# Patient Record
Sex: FEMALE
[De-identification: names, ages, dates, MRNs, and addresses within clinical notes are randomized per-mention and may not be internally consistent; named-entity substitution may affect disease eponyms.]

---

## 2020-04-19 ENCOUNTER — HOSPITAL ENCOUNTER (EMERGENCY)
Dept: HOSPITAL 88 - ER | Age: 45
Discharge: HOME | End: 2020-04-19
Payer: COMMERCIAL

## 2020-04-19 VITALS — SYSTOLIC BLOOD PRESSURE: 131 MMHG | DIASTOLIC BLOOD PRESSURE: 88 MMHG

## 2020-04-19 VITALS — HEIGHT: 63 IN | BODY MASS INDEX: 27.11 KG/M2 | WEIGHT: 153 LBS

## 2020-04-19 DIAGNOSIS — D64.9: ICD-10-CM

## 2020-04-19 DIAGNOSIS — R04.0: Primary | ICD-10-CM

## 2020-04-19 DIAGNOSIS — I10: ICD-10-CM

## 2020-04-19 PROCEDURE — 99283 EMERGENCY DEPT VISIT LOW MDM: CPT

## 2020-04-19 NOTE — XMS REPORT
Patient Summary Document

                             Created on: 2020



JIMENA COBURN

External Reference #: [id number]

: 1975

Sex: Female



Demographics







                          Address                   [address] [place] [address]  [address]

 

                          Home Phone                [phone]

 

                          Preferred Language        Unknown

 

                          Marital Status            Unknown

 

                          Adventist Affiliation     Unknown

 

                          Race                      Unknown

 

                          Ethnic Group              Unknown





Author







                          Author                    [facility]

 

                          Address                   Unknown

 

                          Phone                     Unavailable







Support







                Name            Relationship    Address         Phone

 

                    KYLE COBURN       PRS                 [address] [place]  [address] [phone]

 

                    KYLE  IRISH       PRS                 [address] [place]  [address] [phone]







Care Team Providers







                    Care Team Member Name    Role                Phone

 

                          Unavailable               Unavailable







Payers







             Payer Name    Policy Type    Policy Number    Effective Date    Expiration Date







Problems

This patient has no known problems.



Allergies, Adverse Reactions, Alerts







          Allergy Name    Allergy Type    Status    Severity    Reaction(s)    Onset Date    Inactive 

Date                      Treating Clinician        Comments

 

        No Known Allergies    DA      Active    U               2016-10-31 00:00:00                     







Medications

This patient has no known medications.

## 2020-04-21 ENCOUNTER — HOSPITAL ENCOUNTER (OUTPATIENT)
Dept: HOSPITAL 88 - ER | Age: 45
Setting detail: OBSERVATION
LOS: 2 days | Discharge: HOME | End: 2020-04-23
Attending: INTERNAL MEDICINE | Admitting: INTERNAL MEDICINE
Payer: COMMERCIAL

## 2020-04-21 VITALS — DIASTOLIC BLOOD PRESSURE: 88 MMHG | SYSTOLIC BLOOD PRESSURE: 143 MMHG

## 2020-04-21 VITALS — DIASTOLIC BLOOD PRESSURE: 90 MMHG | SYSTOLIC BLOOD PRESSURE: 136 MMHG

## 2020-04-21 VITALS — SYSTOLIC BLOOD PRESSURE: 136 MMHG | DIASTOLIC BLOOD PRESSURE: 90 MMHG

## 2020-04-21 VITALS — WEIGHT: 182 LBS | BODY MASS INDEX: 32.25 KG/M2 | HEIGHT: 63 IN

## 2020-04-21 VITALS — DIASTOLIC BLOOD PRESSURE: 84 MMHG | SYSTOLIC BLOOD PRESSURE: 146 MMHG

## 2020-04-21 VITALS — SYSTOLIC BLOOD PRESSURE: 154 MMHG | DIASTOLIC BLOOD PRESSURE: 88 MMHG

## 2020-04-21 VITALS — DIASTOLIC BLOOD PRESSURE: 85 MMHG | SYSTOLIC BLOOD PRESSURE: 143 MMHG

## 2020-04-21 VITALS — SYSTOLIC BLOOD PRESSURE: 143 MMHG | DIASTOLIC BLOOD PRESSURE: 88 MMHG

## 2020-04-21 DIAGNOSIS — I10: ICD-10-CM

## 2020-04-21 DIAGNOSIS — E66.01: ICD-10-CM

## 2020-04-21 DIAGNOSIS — R04.0: Primary | ICD-10-CM

## 2020-04-21 DIAGNOSIS — K59.00: ICD-10-CM

## 2020-04-21 DIAGNOSIS — J34.2: ICD-10-CM

## 2020-04-21 DIAGNOSIS — J01.90: ICD-10-CM

## 2020-04-21 LAB
ANION GAP SERPL CALC-SCNC: 9.5 MMOL/L (ref 8–16)
BASOPHILS # BLD AUTO: 0.1 10*3/UL (ref 0–0.1)
BASOPHILS NFR BLD AUTO: 0.6 % (ref 0–1)
BUN SERPL-MCNC: 9 MG/DL (ref 7–26)
BUN/CREAT SERPL: 12 (ref 6–25)
CALCIUM SERPL-MCNC: 8.8 MG/DL (ref 8.4–10.2)
CHLORIDE SERPL-SCNC: 105 MMOL/L (ref 98–107)
CO2 SERPL-SCNC: 26 MMOL/L (ref 22–29)
DEPRECATED INR PLAS: 0.9
DEPRECATED NEUTROPHILS # BLD AUTO: 6.4 10*3/UL (ref 2.1–6.9)
EGFRCR SERPLBLD CKD-EPI 2021: > 60 ML/MIN (ref 60–?)
EOSINOPHIL # BLD AUTO: 0.3 10*3/UL (ref 0–0.4)
EOSINOPHIL NFR BLD AUTO: 2.7 % (ref 0–6)
ERYTHROCYTE [DISTWIDTH] IN CORD BLOOD: 16.3 % (ref 11.7–14.4)
GLUCOSE SERPLBLD-MCNC: 147 MG/DL (ref 74–118)
HCT VFR BLD AUTO: 31.3 % (ref 34.2–44.1)
HGB BLD-MCNC: 10.1 G/DL (ref 12–16)
LYMPHOCYTES # BLD: 2.1 10*3/UL (ref 1–3.2)
LYMPHOCYTES NFR BLD AUTO: 21.8 % (ref 18–39.1)
MCH RBC QN AUTO: 25.6 PG (ref 28–32)
MCHC RBC AUTO-ENTMCNC: 32.3 G/DL (ref 31–35)
MCV RBC AUTO: 79.4 FL (ref 81–99)
MONOCYTES # BLD AUTO: 0.8 10*3/UL (ref 0.2–0.8)
MONOCYTES NFR BLD AUTO: 8.1 % (ref 4.4–11.3)
NEUTS SEG NFR BLD AUTO: 66.4 % (ref 38.7–80)
PLATELET # BLD AUTO: 309 X10E3/UL (ref 140–360)
POTASSIUM SERPL-SCNC: 3.5 MMOL/L (ref 3.5–5.1)
PROTHROMBIN TIME: 12.7 SECONDS (ref 11.9–14.5)
RBC # BLD AUTO: 3.94 X10E6/UL (ref 3.6–5.1)
SODIUM SERPL-SCNC: 137 MMOL/L (ref 136–145)

## 2020-04-21 PROCEDURE — 36415 COLL VENOUS BLD VENIPUNCTURE: CPT

## 2020-04-21 PROCEDURE — 86900 BLOOD TYPING SEROLOGIC ABO: CPT

## 2020-04-21 PROCEDURE — 86850 RBC ANTIBODY SCREEN: CPT

## 2020-04-21 PROCEDURE — 85610 PROTHROMBIN TIME: CPT

## 2020-04-21 PROCEDURE — 85025 COMPLETE CBC W/AUTO DIFF WBC: CPT

## 2020-04-21 PROCEDURE — 70487 CT MAXILLOFACIAL W/DYE: CPT

## 2020-04-21 PROCEDURE — 99284 EMERGENCY DEPT VISIT MOD MDM: CPT

## 2020-04-21 PROCEDURE — 80053 COMPREHEN METABOLIC PANEL: CPT

## 2020-04-21 PROCEDURE — 80048 BASIC METABOLIC PNL TOTAL CA: CPT

## 2020-04-21 RX ADMIN — Medication PRN MG: at 10:55

## 2020-04-21 RX ADMIN — TAZOBACTAM SODIUM AND PIPERACILLIN SODIUM SCH MLS/HR: 375; 3 INJECTION, SOLUTION INTRAVENOUS at 20:10

## 2020-04-21 RX ADMIN — TAZOBACTAM SODIUM AND PIPERACILLIN SODIUM SCH MLS/HR: 375; 3 INJECTION, SOLUTION INTRAVENOUS at 12:22

## 2020-04-21 RX ADMIN — Medication PRN MG: at 06:55

## 2020-04-21 RX ADMIN — SODIUM CHLORIDE PRN MG: 900 INJECTION INTRAVENOUS at 10:51

## 2020-04-21 RX ADMIN — Medication PRN MG: at 20:08

## 2020-04-21 RX ADMIN — SODIUM CHLORIDE SCH MLS/HR: 9 INJECTION, SOLUTION INTRAVENOUS at 03:35

## 2020-04-21 RX ADMIN — AMLODIPINE BESYLATE SCH MG: 5 TABLET ORAL at 13:24

## 2020-04-21 RX ADMIN — TAZOBACTAM SODIUM AND PIPERACILLIN SODIUM SCH MLS/HR: 375; 3 INJECTION, SOLUTION INTRAVENOUS at 06:28

## 2020-04-21 RX ADMIN — Medication PRN MG: at 03:30

## 2020-04-21 RX ADMIN — HYDROCODONE BITARTRATE AND ACETAMINOPHEN PRN EA: 5; 325 TABLET ORAL at 15:55

## 2020-04-21 RX ADMIN — HYDROCODONE BITARTRATE AND ACETAMINOPHEN PRN EA: 5; 325 TABLET ORAL at 22:11

## 2020-04-21 RX ADMIN — SODIUM CHLORIDE SCH MLS/HR: 9 INJECTION, SOLUTION INTRAVENOUS at 10:51

## 2020-04-21 RX ADMIN — SODIUM CHLORIDE PRN MG: 900 INJECTION INTRAVENOUS at 20:08

## 2020-04-21 RX ADMIN — SODIUM CHLORIDE PRN MG: 900 INJECTION INTRAVENOUS at 03:25

## 2020-04-21 RX ADMIN — SODIUM CHLORIDE PRN MG: 900 INJECTION INTRAVENOUS at 06:55

## 2020-04-21 NOTE — DIAGNOSTIC IMAGING REPORT
History:Nose bleed.



Comparison studies: None



Technique:

Axial images were obtained through the facial region.

Coronal and sagittal images reconstructed from the axial data.

Dose modulation, iterative reconstruction, and/or weight based adjustment of

the mA/kV was utilized to reduce the radiation dose to as low as reasonably

achievable.

Intravenous contrast: 100 cc of Isovue 300.



Findings:



Soft tissues: 

Status post packing material in bilateral nasal cavity. Partial opacification

of bilateral nasal cavity that soft tissue density mixed with air related to

recent intervention. Soft tissue air also noted within the 1 and nasopharynx

also likely related to recent intervention. No discrete enhancing mass or

peripheral rim-enhancing fluid collection.



Bones: 

No acute fracture. Deviation of nasal septum to the left.



Orbits: 

Globes: Intact.

Extra or intraconal abnormalities: None.



Paranasal sinuses:

 Mild mucosal thickening in bilateral sphenoid sinuses. Minimal mucosal

thickening in the inferior aspect of left frontal sinus.

Mild mucosal thickening in bilateral ethmoid sinuses.

Moderate bilateral (right more than left) mucosal thickeningV air, hyperdense

material with fluid level in bilateral maxillary sinuses.



IMPRESSION:



1. Soft tissue density admixed with air in bilateral nasal cavity and

nasopharynx possibly related to recent intervention status post placement of

nasal packing material.



2. Moderate bilateral maxillary sinus mucosal inflammatory changes with

associated intrinsic hyperdensity and fluid level may represent inspissated

mucus secretions, allergic fungal sinusitis or fluid admixed with hemorrhage in

appropriate clinical setting.



3. Mild to moderate mucosal inflammatory changes in bilateral sphenoid and

ethmoid sinuses.



4. No acute fracture. No abnormal enhancing mass or soft tissue abscess.



Signed by: Dr. Mary Juan M.D. on 4/21/2020 2:49 AM

## 2020-04-21 NOTE — HISTORY AND PHYSICAL
CHIEF COMPLAINT:  Epistaxis.

 

HISTORY OF PRESENT ILLNESS:  This is a 45-year-old  female, who apparently has

been having epistaxis ongoing for the last several days.  The patient reports she went

to [facility] on 2 occasions with very minimal relief and at that time, she has had

significant amount of epistaxis and was discharged.  She then presented to [facility] on 2 occasions.  Of note, at the first occasion, she was given a Rhino

Rocket and was sent to ENT in which she saw Dr. Chapman on Monday of this week,

04/20/2020, in which she cauterized the area of the bleeding and was discharged to home.

 Last night, she experienced significant amount of epistaxis and came into the ED for

further evaluation and management.  The patient was admitted for further evaluation.

The patient has Rhino Rockets in both nasal canals.  ENT came and evaluated the patient.

 CT imaging concerning for underlying sinusitis in which ID was consulted. 

 

REVIEW OF SYSTEMS:

Pertinent positive:  Epistaxis. 

 

The rest of the 14-point review of systems have been reviewed with the patient and are

negative. 

 

ALLERGIES:  ENALAPRIL AND KIWI.

 

MEDICATIONS:  Home medications are amlodipine.

 

PAST MEDICAL HISTORY:  Hypertension.

 

PAST SURGICAL HISTORY:  Reports none.

 

FAMILY HISTORY:  Hypertension and diabetes.

 

SOCIAL HISTORY:  No drugs.  No alcohol.  Does not smoke.  Good social support.

 

PHYSICAL EXAMINATION:

VITAL SIGNS:  Temperature is 96.2, pulse 83, respiratory rate is 18, blood pressure

146/84, and pulse ox 100% on room air. 

GENERAL:  No acute distress.  Alert and oriented x3.  Cooperative on examination. 

HEENT:  Head; normocephalic and atraumatic.  Eyes; pupils are reactive to light

bilaterally.  Extraocular movements are intact bilaterally.  Neck was supple.  Good

range of motion.  Throat, no evidence of erythema or exudates in the posterior pharynx.

Has poor dentition. 

PULMONARY:  Clear to auscultation bilaterally.  No wheezing, rales, or rhonchi.  No

crackles appreciated. 

CARDIOVASCULAR:  Positive S1 and S2.  No murmurs, rubs, or gallops appreciated. 

ABDOMEN:  Soft, nondistended, and nontender to palpation.  Bowel sounds present. 

MUSCULOSKELETAL:  Strength is 5/5 throughout.  No evidence of any muscle deficits on

examination.  No weakness appreciated. 

NEUROLOGIC:  Cranial nerves 2 thorough 12 grossly intact.  No evidence of any

neurological deficits on examination. 

SKIN:  Intact.  Warm to touch.  Good capillary refill. 

PSYCHIATRIC:  Normal affect and mood. 

EXTREMITIES:  No edema.  Good range of motion throughout.

LABORATORY DATA:  Labs show white count 9.5, hemoglobin 10, hematocrit 31, platelets of

309.  Coagulation; PT 12, INR 0.9.  Chemistry; sodium 137, potassium 3.5, chloride 105,

bicarb 26, anion gap 7.5, BUN 9, creatinine 0.75, glucose 147, calcium is 8.8. 

 

IMAGING STUDIES:  CT of the face shows soft tissue density mixed with air in the

bilateral nasal cannula and nasopharynx secondary to underlying and recent intervention.

 Moderate bilateral maxillary sinus mucosal inflammatory changes with associated

intrinsic hyperdensity and fluids, may represent some mucus secretions, possible

underlying infection.  Mild-to-moderate mucosal inflammation, changes in the bilateral

sphenoid and ethmoid sinuses.  No acute fracture.  No abnormal enhancing mass or soft

tissue abscess. 

 

IMPRESSION:  

1. Nasal epistaxis.

2. Hypertension.

3. Morbidly obese.

4. Sinusitis.

 

PLAN:  At this time, she has bilateral nasal rocket placed by ER.  ENT was consulted and

he came and evaluated the patient.  We will like to monitor the patient over the next

several days.  Her hemoglobin is stable.  We will get morning labs and we will monitor

these very closely.  ID was consulted for underlying sinusitis.  She is on IV

antibiotics.  We will continue with antibiotics for now.  Hold all anticoagulation and

anti-platelet therapy for now.  Encourage ambulation.  Continue with a regular heart

healthy diet.  Monitor very closely.  Follow up with the rest of the consultants. 

 

 

 

 

______________________________

MD FALGUNI Freitas/ZENON

D:  04/21/2020 13:45:17

T:  04/21/2020 20:41:45

Job #:  [id number]

## 2020-04-21 NOTE — NUR
Received bedside report from day nurse. Patient awake and resting in bed, no s/s of distress 
at this time. Bed locked and in low position, alarm on, call light placed within reach. 
Instructed to call for assistance if needed, verbalized understanding. All safety measures 
in place. Will continue to monitor.

## 2020-04-21 NOTE — NUR
PT RECEIVED FROM ER. ANALISA4. EDUCATED PT ABOUT FALL PRECAUTIONS. PT VERBALIZED UNDERSTANDING. 
 CALL LIGHT WITH IN EASY REACH. BED IS LOW AND LOCKED.  SIDE RAILS X2. PT DENIES NEEDS AT 
THIS TIME.

## 2020-04-21 NOTE — NUR
Pt went to restroom ambulatory with steady gait. Nasal bleeding has been 
controlled per pt report. Pain at 5/10. Dr Sullivan minimizes the air pressure of 
the rhino rocket for pt comfort.

## 2020-04-21 NOTE — NUR
Assumed care from offgoing RN, pt lying on R side, no active bleeding noted. Pt 
given warm blanket. Pt report pain medication helping.

## 2020-04-21 NOTE — CONSULTATION
DATE OF CONSULTATION:    

 

REASON FOR CONSULTATION:  Sinusitis.

 

HISTORY OF PRESENT ILLNESS:  Thank you for asking me to see this patient.  This patient

who is a 45-year-old  female, who comes in with nosebleeds.  She was

originally in [place].  She was given some local treatment, discharged home, coming

back with nosebleed.  The patient is currently seen by ENT.  Her nose was packed. 

 

PAST MEDICAL HISTORY:  Hypertension.

 

SURGICAL HISTORY:  She denies.

 

ALLERGIES:  NKA.

 

SOCIAL HISTORY:  There is no smoking, drug abuse, or alcohol abuse.

 

FAMILY HISTORY:  Otherwise noncontributory.

 

REVIEW OF SYSTEMS:

HEENT:  Negative. 

PULMONARY:  Negative. 

CARDIAC:  Negative. 

:  Negative. 

SKIN:  Otherwise negative.

 

PHYSICAL EXAMINATION:

GENERAL:  She is currently alert, oriented, does not seem to be in acute distress. 

VITAL SIGNS:  Stable.  Currently afebrile. 

HEENT:  She is nonicteric. 

NECK:  Supple. 

CHEST:  Clear. 

HEART:  S1, S2.  No S3, S4 or no murmurs. 

ABDOMEN:  Soft.

IMPRESSION:  Nosebleed.  Agree with Zosyn, can change to oral Augmentin.  She will be

discharged home with oral Augmentin after she is seen by ENT.  We will follow with you. 

 

Thank you for asking me to see this patient.

 

 

 

 

______________________________

MD OSEI Benoit/ZENON

D:  04/21/2020 15:51:04

T:  04/21/2020 21:20:58

Job #:  [id number]

## 2020-04-22 VITALS — SYSTOLIC BLOOD PRESSURE: 160 MMHG | DIASTOLIC BLOOD PRESSURE: 107 MMHG

## 2020-04-22 VITALS — SYSTOLIC BLOOD PRESSURE: 146 MMHG | DIASTOLIC BLOOD PRESSURE: 89 MMHG

## 2020-04-22 VITALS — DIASTOLIC BLOOD PRESSURE: 77 MMHG | SYSTOLIC BLOOD PRESSURE: 133 MMHG

## 2020-04-22 VITALS — SYSTOLIC BLOOD PRESSURE: 147 MMHG | DIASTOLIC BLOOD PRESSURE: 96 MMHG

## 2020-04-22 VITALS — SYSTOLIC BLOOD PRESSURE: 139 MMHG | DIASTOLIC BLOOD PRESSURE: 97 MMHG

## 2020-04-22 LAB
ALBUMIN SERPL-MCNC: 3.6 G/DL (ref 3.5–5)
ALBUMIN/GLOB SERPL: 0.9 {RATIO} (ref 0.8–2)
ALP SERPL-CCNC: 80 IU/L (ref 40–150)
ALT SERPL-CCNC: 37 IU/L (ref 0–55)
ANION GAP SERPL CALC-SCNC: 12.3 MMOL/L (ref 8–16)
BASOPHILS # BLD AUTO: 0.1 10*3/UL (ref 0–0.1)
BASOPHILS NFR BLD AUTO: 0.4 % (ref 0–1)
BUN SERPL-MCNC: 5 MG/DL (ref 7–26)
BUN/CREAT SERPL: 6 (ref 6–25)
CALCIUM SERPL-MCNC: 9.1 MG/DL (ref 8.4–10.2)
CHLORIDE SERPL-SCNC: 101 MMOL/L (ref 98–107)
CO2 SERPL-SCNC: 27 MMOL/L (ref 22–29)
DEPRECATED INR PLAS: 0.96
DEPRECATED NEUTROPHILS # BLD AUTO: 10.4 10*3/UL (ref 2.1–6.9)
EGFRCR SERPLBLD CKD-EPI 2021: > 60 ML/MIN (ref 60–?)
EOSINOPHIL # BLD AUTO: 0.1 10*3/UL (ref 0–0.4)
EOSINOPHIL NFR BLD AUTO: 0.5 % (ref 0–6)
ERYTHROCYTE [DISTWIDTH] IN CORD BLOOD: 16.4 % (ref 11.7–14.4)
GLOBULIN PLAS-MCNC: 4.1 G/DL (ref 2.3–3.5)
GLUCOSE SERPLBLD-MCNC: 124 MG/DL (ref 74–118)
HCT VFR BLD AUTO: 29.9 % (ref 34.2–44.1)
HGB BLD-MCNC: 9.4 G/DL (ref 12–16)
LYMPHOCYTES # BLD: 2 10*3/UL (ref 1–3.2)
LYMPHOCYTES NFR BLD AUTO: 14.8 % (ref 18–39.1)
MCH RBC QN AUTO: 25.3 PG (ref 28–32)
MCHC RBC AUTO-ENTMCNC: 31.4 G/DL (ref 31–35)
MCV RBC AUTO: 80.6 FL (ref 81–99)
MONOCYTES # BLD AUTO: 1 10*3/UL (ref 0.2–0.8)
MONOCYTES NFR BLD AUTO: 7.4 % (ref 4.4–11.3)
NEUTS SEG NFR BLD AUTO: 76.5 % (ref 38.7–80)
PLATELET # BLD AUTO: 283 X10E3/UL (ref 140–360)
POTASSIUM SERPL-SCNC: 4.3 MMOL/L (ref 3.5–5.1)
PROTHROMBIN TIME: 13.4 SECONDS (ref 11.9–14.5)
RBC # BLD AUTO: 3.71 X10E6/UL (ref 3.6–5.1)
SODIUM SERPL-SCNC: 136 MMOL/L (ref 136–145)

## 2020-04-22 RX ADMIN — Medication PRN MG: at 09:09

## 2020-04-22 RX ADMIN — TAZOBACTAM SODIUM AND PIPERACILLIN SODIUM SCH MLS/HR: 375; 3 INJECTION, SOLUTION INTRAVENOUS at 11:24

## 2020-04-22 RX ADMIN — DOCUSATE SODIUM SCH MG: 100 TABLET, FILM COATED ORAL at 09:10

## 2020-04-22 RX ADMIN — SODIUM CHLORIDE PRN MG: 900 INJECTION INTRAVENOUS at 09:15

## 2020-04-22 RX ADMIN — Medication PRN MG: at 05:11

## 2020-04-22 RX ADMIN — SODIUM CHLORIDE PRN MG: 900 INJECTION INTRAVENOUS at 20:43

## 2020-04-22 RX ADMIN — Medication PRN MG: at 01:20

## 2020-04-22 RX ADMIN — HYDROCODONE BITARTRATE AND ACETAMINOPHEN PRN EA: 10; 325 TABLET ORAL at 16:52

## 2020-04-22 RX ADMIN — TAZOBACTAM SODIUM AND PIPERACILLIN SODIUM SCH MLS/HR: 375; 3 INJECTION, SOLUTION INTRAVENOUS at 03:57

## 2020-04-22 RX ADMIN — DOCUSATE SODIUM SCH MG: 100 TABLET, FILM COATED ORAL at 16:48

## 2020-04-22 RX ADMIN — AMLODIPINE BESYLATE SCH MG: 5 TABLET ORAL at 07:58

## 2020-04-22 RX ADMIN — SODIUM CHLORIDE PRN MG: 900 INJECTION INTRAVENOUS at 01:20

## 2020-04-22 RX ADMIN — TAZOBACTAM SODIUM AND PIPERACILLIN SODIUM SCH MLS/HR: 375; 3 INJECTION, SOLUTION INTRAVENOUS at 20:35

## 2020-04-22 RX ADMIN — Medication PRN MG: at 13:11

## 2020-04-22 RX ADMIN — Medication PRN MG: at 20:43

## 2020-04-22 NOTE — NUR
Bedside report given to day nurse. Patient awake and sitting up in bed, no s/s of distress 
at this time. All safety measures in place.

## 2020-04-22 NOTE — PROGRESS NOTE
DATE:    

 

SUBJECTIVE:  Ms. Singh is a pleasant 45-year-old lady, who was admitted with a chief

complaint of nosebleed.  The patient was seen by ENT, has now bilateral nostrils are

packed and comfortable in bed with poor appetite. 

 

REVIEW OF SYSTEMS:

Complain of constipation and needs some help.  Also complained of poor appetite

secondary to unable to taste the food.  However, no nausea, no vomiting, no fever, no

chills.  No chest pain.  No shortness of breath.  No rash.  No headache. 

 

PHYSICAL EXAMINATION:

VITAL SIGNS:  Temperature 98.2, pulse 98, respirations 20, and blood pressure 160/107. 

GENERAL:  Alert and oriented, in no acute distress. 

CV:  S1 and S2. 

CHEST:  Equal expansion.  Clear to auscultation.  No acute distress. 

ABDOMEN:  Soft and nontender.  No distention. 

HEENT:  Moist.  No pallor.  No JVD.  Bilateral nostrils are packed.  ENT noted the same

epistaxis with which continues to have slight oozing.  The patient was seen by ENT

earlier this morning. 

EXTREMITIES:  No acute finding.

MEDICATIONS:  Medication list reviewed.  As far as Infectious Disease point of view, the

patient is on Zosyn. 

 

LABORATORY STUDIES:  White count of 13.58, hemoglobin 9.4, platelet 283.  Sodium 136,

potassium 4.3, creatinine 0.77. 

 

RADIOLOGY STUDIES:  Had a CT of the face yesterday showing the soft-tissue density

admixed with air in bilateral nasal cavity with nasopharynx, possibly related to the

recent intubation, status post placement of nasal packing material.  Also, showed

moderate bilateral maxillary sinus mucosal inflammatory changes with associated

intrinsic hyperdense area of fluid level may represent inspissated mucous secretion.

Mild-to-moderate mucosal inflammatory changes also was noted in bilateral sphenoids and

ethmoid sinuses.  No acute fracture or no abnormal enhancing mass or soft tissue abscess

noted. 

 

ASSESSMENT AND PLAN:  Nasal bleed/epistaxis, seen by ENT, most likely stays one more day

prior to discharge.  Currently on Zosyn with the plan to discharge with Augmentin when

cleared by ENT.  The patient is constipated.  We will give the patient 30 mL of

lactulose and then 100 mg of Colace b.i.d.  Discussed with the nurse about plan of care.

 This case was discussed with Dr. Oden in details.  Further management of this patient

is based on daily findings on laboratory and physical examination. 

 

 

Dictated by Shaggy Arevalo) GITA Saldana

 

______________________________

MD CRIS Benoit/ZENON

D:  04/22/2020 08:46:57

T:  04/22/2020 10:00:24

Job #:  [id number]

## 2020-04-22 NOTE — PROGRESS NOTE
DATE:  04/22/2020

 

Medicine Progress Note 

 

SUBJECTIVE:  The patient is doing well today with no complaints.  She still has

bilateral rhino rockets.  No evidence of any bleeding.  Her hemoglobin did drop today. 

 

PHYSICAL EXAMINATION:

VITAL SIGNS:  Temperature 98, pulse 91, respiratory rate is 20, blood pressure 147/96,

and pulse ox 100% on room air. 

GENERAL:  Not in acute distress.  Alert and oriented x3.  Cooperative on examination. 

HEENT:  Head; normocephalic, atraumatic.  Eyes; pupils are equal, round, and reactive to

light bilaterally.  Extraocular movements intact bilaterally.  Throat; no evidence of

erythema or exudates in the posterior pharynx.  Has poor dentition. 

NECK:  Supple.  Good range of motion. 

PULMONARY:  Clear to auscultation bilaterally.  No wheezing, no rales, no rhonchi, no

crackles appreciated. 

CARDIOVASCULAR:  Positive S1 and S2.  No murmurs, rubs, or gallops appreciated. 

ABDOMEN:  Soft, nondistended, and nontender to palpation.  Bowel sounds present. 

MUSCULOSKELETAL:  Strength is 5/5 throughout.  No evidence of any muscle deficits on

examination.  No weakness appreciated. 

NEUROLOGIC:  Cranial nerves 2 through 12 grossly intact.  No evidence of any

neurological deficits on exam. 

SKIN:  Intact.  Warm to touch.  Good cap refill. 

PSYCHIATRIC:  Normal affect and mood. 

EXTREMITIES:  No edema.  Good range of motion throughout.

LABORATORY FINDINGS:  Show white count is 13.5, hemoglobin 9.4, hematocrit is 29.9, and

platelets of 283.  Chemistry; sodium 136, potassium 4.3, chloride 101, bicarb 27, anion

gap of 12, BUN is 5, creatinine is 0.77, glucose 124, and calcium 9.1.  LFTs within

normal range.  Albumin 10.6. 

 

IMAGING STUDIES:  None new.

 

IMPRESSION:  

1. Nasal epistaxis.

2. Hypertension.

3. Morbidly obese.

4. Sinusitis.

 

PLAN:  At this time, she continues to have bilateral nasal rockets placed by ENT.

Hemoglobin did drop to 9.4.  I am not sure exactly the plan that if he is going to

remove the rhino rockets or keep them for discharge.  She will continue with IV

antibiotics per ID.  Otherwise, we will continue same plan of care and monitor very

closely. 

 

 

 

 

______________________________

MD FALGUNI Freitas/MODL

D:  04/22/2020 22:38:12

T:  04/22/2020 23:30:46

Job #:  [id number]

## 2020-04-22 NOTE — NUR
ASSESSMENT: Spiritual concern

Pt fears her "bleeding won't stop." Pt expressed emotions thru words and tears. Pt states 
she misses her three sons at home. 



Intervention:  Provided empathic listening. Facilitated illness review. Provided prayer. 
Provided information on how to reach , if needed.



Outcome: Pt expressed appreciation for visit. 





EUNICE CHACKO



Spiritual Care Department

O: [phone]

## 2020-04-22 NOTE — CONSULTATION
DATE OF CONSULTATION:  04/21/2020

 

Hospital Consultation 

 

HISTORY OF PRESENT ILLNESS:  I was kindly asked to see this 45-year-old woman, who was

seen by me the day before her presentation for evaluation of epistaxis.  On presentation

to my office on 04/21/2020, she had a pack in the right side of her nose placed by the

emergency department.  The nasal packing was removed.  She was noted to have a

significant nasal septal deviation toward the left side.  The only bleeding points

identified were in the mid and posterior aspect of the right nasal septum.  These areas

were cauterized with silver nitrate.  The patient was observed and did well in the

office and was released to go home.  The night of the nasal cautery, she began having a

recurrence of the epistaxis.  She followed the written directions for blowing her nose,

applying pressure and using Afrin nasal spray.  However, the bleeding failed to stop.

She then presented to [facility] Emergency Department and both

sides of her nose were packed with Rhino Rockets and was subsequently consulted for

evaluation and treatment. 

 

PAST MEDICAL HISTORY:  Reviewed in detail in the chart.

 

PAST SURGICAL HISTORY:  Reviewed in detail in the chart.

 

REVIEW OF SYSTEMS:

Otolaryngology review of systems is noncontributory.

 

PHYSICAL EXAMINATION:

HEENT:  The right external auditory canal and right tympanic membrane were normal.  The

left external auditory canal and left tympanic membrane were normal.  She had Rhino

Rockets present in both sides of the nose.  There was no bleeding from the nostrils.

Intraoral examination was unremarkable.  She had fresh blood along the posterior

pharyngeal wall.  There was no palpable cervical adenopathy.  Due to the fresh blood

along the posterior pharyngeal wall, additional saline was placed and the Rhino Rocket

on both sides.  Recheck approximately 10 minutes later showed no fresh blood along the

posterior pharyngeal wall.  CT scan was obtained that showed sinusitis. 

ASSESSMENT:  

1. Epistaxis.

2. Nasal septal deviation.

3. Sinusitis.

 

PLAN:  Continuation of current therapy with decision regarding removal of the Rhino

Rockets based on clinical course. 

 

 

 

 

______________________________

MD ALICIA Boone/KRISTALL

D:  04/22/2020 07:44:46

T:  04/22/2020 08:49:25

Job #:  [id number]

## 2020-04-23 VITALS — DIASTOLIC BLOOD PRESSURE: 79 MMHG | SYSTOLIC BLOOD PRESSURE: 136 MMHG

## 2020-04-23 VITALS — SYSTOLIC BLOOD PRESSURE: 162 MMHG | DIASTOLIC BLOOD PRESSURE: 102 MMHG

## 2020-04-23 VITALS — DIASTOLIC BLOOD PRESSURE: 103 MMHG | SYSTOLIC BLOOD PRESSURE: 153 MMHG

## 2020-04-23 VITALS — SYSTOLIC BLOOD PRESSURE: 157 MMHG | DIASTOLIC BLOOD PRESSURE: 96 MMHG

## 2020-04-23 VITALS — SYSTOLIC BLOOD PRESSURE: 136 MMHG | DIASTOLIC BLOOD PRESSURE: 79 MMHG

## 2020-04-23 LAB
ANION GAP SERPL CALC-SCNC: 9.1 MMOL/L (ref 8–16)
BASOPHILS # BLD AUTO: 0.1 10*3/UL (ref 0–0.1)
BASOPHILS NFR BLD AUTO: 0.5 % (ref 0–1)
BUN SERPL-MCNC: 5 MG/DL (ref 7–26)
BUN/CREAT SERPL: 7 (ref 6–25)
CALCIUM SERPL-MCNC: 9.1 MG/DL (ref 8.4–10.2)
CHLORIDE SERPL-SCNC: 98 MMOL/L (ref 98–107)
CO2 SERPL-SCNC: 30 MMOL/L (ref 22–29)
DEPRECATED NEUTROPHILS # BLD AUTO: 7.4 10*3/UL (ref 2.1–6.9)
EGFRCR SERPLBLD CKD-EPI 2021: > 60 ML/MIN (ref 60–?)
EOSINOPHIL # BLD AUTO: 0.3 10*3/UL (ref 0–0.4)
EOSINOPHIL NFR BLD AUTO: 2.4 % (ref 0–6)
ERYTHROCYTE [DISTWIDTH] IN CORD BLOOD: 16.6 % (ref 11.7–14.4)
GLUCOSE SERPLBLD-MCNC: 110 MG/DL (ref 74–118)
HCT VFR BLD AUTO: 30.8 % (ref 34.2–44.1)
HGB BLD-MCNC: 9.9 G/DL (ref 12–16)
LYMPHOCYTES # BLD: 2.8 10*3/UL (ref 1–3.2)
LYMPHOCYTES NFR BLD AUTO: 24.3 % (ref 18–39.1)
MCH RBC QN AUTO: 25.8 PG (ref 28–32)
MCHC RBC AUTO-ENTMCNC: 32.1 G/DL (ref 31–35)
MCV RBC AUTO: 80.4 FL (ref 81–99)
MONOCYTES # BLD AUTO: 1 10*3/UL (ref 0.2–0.8)
MONOCYTES NFR BLD AUTO: 8.7 % (ref 4.4–11.3)
NEUTS SEG NFR BLD AUTO: 63.6 % (ref 38.7–80)
PLATELET # BLD AUTO: 317 X10E3/UL (ref 140–360)
POTASSIUM SERPL-SCNC: 4.1 MMOL/L (ref 3.5–5.1)
RBC # BLD AUTO: 3.83 X10E6/UL (ref 3.6–5.1)
SODIUM SERPL-SCNC: 133 MMOL/L (ref 136–145)

## 2020-04-23 RX ADMIN — HYDROCODONE BITARTRATE AND ACETAMINOPHEN PRN EA: 10; 325 TABLET ORAL at 00:36

## 2020-04-23 RX ADMIN — SODIUM CHLORIDE PRN MG: 900 INJECTION INTRAVENOUS at 06:13

## 2020-04-23 RX ADMIN — Medication PRN MG: at 06:13

## 2020-04-23 RX ADMIN — TAZOBACTAM SODIUM AND PIPERACILLIN SODIUM SCH MLS/HR: 375; 3 INJECTION, SOLUTION INTRAVENOUS at 03:48

## 2020-04-23 RX ADMIN — HYDROCODONE BITARTRATE AND ACETAMINOPHEN PRN EA: 10; 325 TABLET ORAL at 11:02

## 2020-04-23 RX ADMIN — DOCUSATE SODIUM SCH MG: 100 TABLET, FILM COATED ORAL at 10:14

## 2020-04-23 NOTE — NUR
PATIENT STATES SHE TAKES METOPROLOL 50MG QDAY AND AMLODIPINE 10MG QDAY AT HOME AND SHOWED 
PICS OF HER MEDICATION BOTTLES. CALLED DR. CHOW TO CONTINUE HOME MED METOPROLOL AND TO 
CHANGE AMLODIPINE FROM 5MG TO THE 10MG PT TAKES AT HOME. COMPLETED MEDICATION CHANGES PER 
NEW ORDERS OBTAINED AND NOTIFIED PT.

## 2020-04-23 NOTE — PROGRESS NOTE
DATE:    

 

SUBJECTIVE:  Ms. Zhao is a pleasant 45-year-old lady admitted with epistaxis, status

post packing by the ENT.  Currently comfortable in bed, in no acute distress. 

 

REVIEW OF SYSTEMS:

No nausea.  No vomiting.  No fever.  No chills.  No chest pain.  No shortness of breath.

 No headache.  Still with poor appetite secondary to not able to taste the food and

tells me that there were no more bleeding. 

 

PHYSICAL EXAMINATION:

VITAL SIGNS:  Temperature 97.1, pulse 96, respiration 17, and blood pressure 157/96. 

GENERAL:  Alert, oriented, in no acute distress. 

CV:  S1 and S2. 

CHEST:  Equal expansion.  Clear to auscultation.  No acute distress. 

ABDOMEN:  Soft and nontender.  No distention. 

HEENT:  Moist.  No pallor.  No JVD.  Bilateral nostrils are packed by ENT.  No obvious

significant drainage/bleeding noted in the throat. 

MEDICATIONS:  Medication list reviewed and as far as Infectious Disease point of view,

the patient is on Zosyn. 

 

LABORATORY STUDIES:  White blood cells of 11.56, improved from 13.58, hemoglobin 9.9,

and platelet 317.  Sodium 133, potassium 4.1, and creatinine 0.7. 

 

RADIOLOGY STUDIES:  No new radiology studies available.

 

ASSESSMENT AND PLAN:  

1. Epistaxis, seen by ENT, status post packing, cleared by ENT for discharge.  There is

a plan to remove the packing in the office. 

2. Constipation, resolved.

3. Pain, controlled.

4. Hypertension. 

The patient is seen and evaluated.  Discussed with Dr. Oden.  Prescription in chart

for Augmentin for 5 days.  Okay to discharge from ID point of view.  Follow up with ID

in 2 weeks. 

 

 

Dictated by Shaggy Saldana PA-C (Al)

 

______________________________

Aniket Oden MD

 

AS/KRSITALL

D:  04/23/2020 09:59:11

T:  04/23/2020 10:50:00

Job #:  [id number]

## 2020-04-23 NOTE — DISCHARGE SUMMARY
FINAL DISCHARGE DIAGNOSES:  

1. Bilateral nasal epistaxis.

2. Acute sinusitis.

3. Hypertension.

4. Morbidly obese.

 

CONSULTANTS:  ID and ENT.

 

VITAL SIGNS:  Temperature is 97.1, pulse 89 respiratory rate is 17, blood pressure

136/79, pulse oximetry is 96% on room air. 

 

LABORATORY DATA:  Labs show white count is 11, hemoglobin 9.9, hematocrit is 31, and

platelets of 317.  Coagulation; PT 13, INR 0.96.  Chemistry; sodium 133, potassium 4.1,

chloride 98, bicarb 30, anion gap of 9, BUN is 5, and creatinine is 0.7.  Her glucose is

110, calcium is 9.1.  Her LFTs within normal range.  Albumin 3.6. 

 

MICROBIOLOGY:  None.

 

IMAGING STUDIES:  CT of the face showed soft tissue density mixed with air in bilateral

nasal cavity with nasopharynx, possibly related to underlying intervention, status post

placement of nasal packing.  Moderate bilateral maxillary sinus mucosal inflammation,

changes with the associated intrinsic hyperdensity and fluid level with an inspissated

mucus secretions, possible underlying sinusitis.  Mild-to-moderate mucosal inflammatory

changes in the bilateral sphenoid and ethmoid sinuses.  No acute fracture.  No abnormal

enhancing mass or soft tissue abscess. 

 

HOSPITAL COURSE:  This is a 45-year-old  female, morbidly obese, who had

spontaneous epistaxis and was seen by ENT as an outpatient, now presents to the ED with

worsening epistaxis needing further evaluation and management.  The patient recently had

cauterization at the ENT office, but she had continuous worsening nosebleeds and came in

for further evaluation.  On admission, her hemoglobin was 10.1 and on discharge, her

hemoglobin is 9.9.  She had bilateral nasal rhino rockets with balloon inflation

performed in the ED.  ENT was consulted.  While here, the patient was being monitored

very closely.  Her hemoglobin maintained stable.  No further workup needed by ENT.  He

cleared the patient for discharge.  She was advised to follow up tomorrow in his office

on 04/24/2020 to remove the rhino rockets bilaterally.  She was discharged with pain

control as well.  As for ID, they were consulted for underlying sinusitis.  She was on

IV antibiotics here and discharged on oral antibiotics.  No further workup was needed by

ID or ENT and she has been cleared for discharge by both consultants.  On the day of

discharge, vital signs were stable.  Labs reviewed and stable.  The patient is seen,

evaluated, and examined thoroughly on the day of discharge with no other complaints.

The patient verbalized understanding and agrees to plan of care to follow up accordingly

as an outpatient with primary care physician in 1 week and the ENT specialist tomorrow

on 04/24/2020 in the morning to get the rhino rockets removed, and ID in 2 weeks' time. 

 

MEDICATIONS:  See med reconciliation form.

 

DISPOSITION:  Home.

 

CONDITION:  Stable.

 

DIET:  Heart healthy. 

 

In the event of any worsening symptoms, the patient was advised come back to the ED for

further evaluation. 

 

Discharge summary took greater than 35 minutes.

 

 

 

 

______________________________

MD FALGUNI Freitas/ZENON

D:  04/23/2020 21:27:10

T:  04/23/2020 23:35:29

Job #:  [id number]